# Patient Record
Sex: MALE | Race: WHITE
[De-identification: names, ages, dates, MRNs, and addresses within clinical notes are randomized per-mention and may not be internally consistent; named-entity substitution may affect disease eponyms.]

---

## 2017-06-15 ENCOUNTER — HOSPITAL ENCOUNTER (OUTPATIENT)
Dept: HOSPITAL 19 - COL.LAB | Age: 67
End: 2017-06-15
Payer: COMMERCIAL

## 2017-06-15 DIAGNOSIS — I10: Primary | ICD-10-CM

## 2017-06-15 DIAGNOSIS — D50.9: ICD-10-CM

## 2017-06-15 LAB
ADJUSTED CALCIUM: 9.5 MG/DL (ref 8.4–10.2)
ALBUMIN SERPL-MCNC: 4.3 GM/DL (ref 3.5–5)
ALP SERPL-CCNC: 66 U/L (ref 50–136)
ALT SERPL-CCNC: 39 U/L (ref 21–72)
ANION GAP SERPL CALC-SCNC: 14 MMOL/L (ref 7–16)
BILIRUB SERPL-MCNC: 0.7 MG/DL (ref 0–1)
BUN SERPL-MCNC: 18 MG/DL (ref 9–20)
CALCIUM SERPL-MCNC: 9.7 MG/DL (ref 8.4–10.2)
CHLORIDE SERPL-SCNC: 105 MMOL/L (ref 98–107)
CO2 SERPL-SCNC: 25 MMOL/L (ref 22–30)
CREAT SERPL-SCNC: 1.14 MG/DL (ref 0.66–1.25)
ERYTHROCYTE [DISTWIDTH] IN BLOOD BY AUTOMATED COUNT: 21.3 % (ref 11.5–14.5)
GLUCOSE SERPL-MCNC: 110 MG/DL (ref 74–106)
HCT VFR BLD AUTO: 40.3 % (ref 42–52)
HGB BLD-MCNC: 11.4 G/DL (ref 13.5–18)
MCH RBC QN AUTO: 19 PG (ref 27–31)
MCHC RBC AUTO-ENTMCNC: 28 G/DL (ref 33–37)
MCV RBC AUTO: 67 FL (ref 80–100)
PLATELET # BLD AUTO: 295 K/MM3 (ref 130–400)
PMV BLD AUTO: 10.4 FL (ref 7.4–10.4)
POTASSIUM SERPL-SCNC: 3.7 MMOL/L (ref 3.4–5)
PROT SERPL-MCNC: 7.2 GM/DL (ref 6.4–8.2)
RBC # BLD AUTO: 6.05 M/MM3 (ref 4.2–5.6)
SODIUM SERPL-SCNC: 144 MMOL/L (ref 137–145)
WBC # BLD AUTO: 12.9 K/MM3 (ref 4.8–10.8)

## 2017-09-12 ENCOUNTER — HOSPITAL ENCOUNTER (OUTPATIENT)
Dept: HOSPITAL 19 - SDCO | Age: 67
Discharge: HOME | End: 2017-09-12
Attending: INTERNAL MEDICINE
Payer: COMMERCIAL

## 2017-09-12 VITALS — SYSTOLIC BLOOD PRESSURE: 126 MMHG | DIASTOLIC BLOOD PRESSURE: 81 MMHG | TEMPERATURE: 98.1 F | HEART RATE: 85 BPM

## 2017-09-12 VITALS — SYSTOLIC BLOOD PRESSURE: 112 MMHG | HEART RATE: 81 BPM | DIASTOLIC BLOOD PRESSURE: 80 MMHG

## 2017-09-12 VITALS — HEART RATE: 82 BPM | DIASTOLIC BLOOD PRESSURE: 94 MMHG | SYSTOLIC BLOOD PRESSURE: 108 MMHG

## 2017-09-12 VITALS — HEART RATE: 72 BPM | DIASTOLIC BLOOD PRESSURE: 81 MMHG | SYSTOLIC BLOOD PRESSURE: 132 MMHG

## 2017-09-12 VITALS — SYSTOLIC BLOOD PRESSURE: 157 MMHG | DIASTOLIC BLOOD PRESSURE: 97 MMHG | HEART RATE: 103 BPM | TEMPERATURE: 98.1 F

## 2017-09-12 VITALS — BODY MASS INDEX: 27.03 KG/M2 | WEIGHT: 162.26 LBS | HEIGHT: 65 IN

## 2017-09-12 DIAGNOSIS — K29.80: ICD-10-CM

## 2017-09-12 DIAGNOSIS — K92.1: ICD-10-CM

## 2017-09-12 DIAGNOSIS — K29.30: ICD-10-CM

## 2017-09-12 DIAGNOSIS — K25.9: ICD-10-CM

## 2017-09-12 DIAGNOSIS — K57.30: Primary | ICD-10-CM

## 2017-09-12 DIAGNOSIS — D50.9: ICD-10-CM

## 2017-09-12 DIAGNOSIS — K64.0: ICD-10-CM

## 2017-11-22 ENCOUNTER — HOSPITAL ENCOUNTER (OUTPATIENT)
Dept: HOSPITAL 19 - COL.LAB | Age: 67
End: 2017-11-22
Payer: SELF-PAY

## 2017-11-22 DIAGNOSIS — Z90.5: ICD-10-CM

## 2017-11-22 DIAGNOSIS — R10.9: Primary | ICD-10-CM

## 2017-11-22 LAB
ANION GAP SERPL CALC-SCNC: 11 MMOL/L (ref 7–16)
BUN SERPL-MCNC: 21 MG/DL (ref 9–20)
CALCIUM SERPL-MCNC: 9.5 MG/DL (ref 8.4–10.2)
CHLORIDE SERPL-SCNC: 102 MMOL/L (ref 98–107)
CO2 SERPL-SCNC: 26 MMOL/L (ref 22–30)
CREAT SERPL-SCNC: 1.02 MG/DL (ref 0.66–1.25)
GLUCOSE SERPL-MCNC: 116 MG/DL (ref 74–106)
PH UR STRIP.AUTO: 5 [PH] (ref 5–8)
POTASSIUM SERPL-SCNC: 3.6 MMOL/L (ref 3.4–5)
RBC # UR: (no result) /HPF
SODIUM SERPL-SCNC: 139 MMOL/L (ref 137–145)
SP GR UR STRIP.AUTO: 1.01 (ref 1–1.03)
URN COLLECT METHOD CLASS: (no result)
WBC # UR: (no result) /HPF

## 2018-07-25 ENCOUNTER — HOSPITAL ENCOUNTER (OUTPATIENT)
Dept: HOSPITAL 19 - COL.LAB | Age: 68
End: 2018-07-25
Attending: NURSE PRACTITIONER
Payer: SELF-PAY

## 2018-07-25 DIAGNOSIS — Z01.89: Primary | ICD-10-CM

## 2018-07-25 LAB
ALBUMIN SERPL-MCNC: 4 GM/DL (ref 3.5–5)
ALP SERPL-CCNC: 90 U/L (ref 50–136)
ALT SERPL-CCNC: 44 U/L (ref 21–72)
ANION GAP SERPL CALC-SCNC: 9 MMOL/L (ref 7–16)
AST SERPL-CCNC: 33 U/L (ref 15–37)
BASOPHILS # BLD: 0.1 10*3/UL (ref 0–0.2)
BASOPHILS NFR BLD AUTO: 0.7 % (ref 0–2)
BILIRUB SERPL-MCNC: 0.5 MG/DL (ref 0–1)
BUN SERPL-MCNC: 19 MG/DL (ref 9–20)
CALCIUM SERPL-MCNC: 9.3 MG/DL (ref 8.4–10.2)
CHLORIDE SERPL-SCNC: 103 MMOL/L (ref 98–107)
CHOLEST SPEC-SCNC: 164 MG/DL (ref 120–200)
CHOLEST/HDLC SERPL-SRTO: 3
CO2 SERPL-SCNC: 29 MMOL/L (ref 22–30)
CREAT SERPL-SCNC: 1.2 MG/DL (ref 0.66–1.25)
EOSINOPHIL # BLD: 0.3 10*3/UL (ref 0–0.7)
EOSINOPHIL NFR BLD: 3.5 % (ref 0–4)
ERYTHROCYTE [DISTWIDTH] IN BLOOD BY AUTOMATED COUNT: 18.9 % (ref 11.5–14.5)
GLUCOSE SERPL-MCNC: 111 MG/DL (ref 74–106)
GRANULOCYTES # BLD AUTO: 56.2 % (ref 42.2–75.2)
HCT VFR BLD AUTO: 34.7 % (ref 42–52)
HDLC SERPL-MCNC: 54 MG/DL
HGB BLD-MCNC: 9.8 G/DL (ref 13.5–18)
LDLC SERPL-MCNC: 75 MG/DL
LYMPHOCYTES # BLD: 2.2 10*3/UL (ref 1.2–3.4)
LYMPHOCYTES NFR BLD: 28.6 % (ref 20–51)
MCH RBC QN AUTO: 19 PG (ref 27–31)
MCHC RBC AUTO-ENTMCNC: 28 G/DL (ref 33–37)
MCV RBC AUTO: 66 FL (ref 80–100)
MONOCYTES # BLD: 0.8 10*3/UL (ref 0.1–0.6)
MONOCYTES NFR BLD AUTO: 10.7 % (ref 1.7–9.3)
NEUTROPHILS # BLD: 4.3 10*3/UL (ref 1.4–6.5)
PLATELET # BLD AUTO: 345 K/MM3 (ref 130–400)
PMV BLD AUTO: 10 FL (ref 7.4–10.4)
POTASSIUM SERPL-SCNC: 3.5 MMOL/L (ref 3.4–5)
PROT SERPL-MCNC: 7.6 GM/DL (ref 6.4–8.2)
RBC # BLD AUTO: 5.29 M/MM3 (ref 4.2–5.6)
SODIUM SERPL-SCNC: 141 MMOL/L (ref 137–145)
TRIGL SERPL-MCNC: 175 MG/DL

## 2019-07-03 ENCOUNTER — HOSPITAL ENCOUNTER (EMERGENCY)
Dept: HOSPITAL 19 - COL.ER | Age: 69
Discharge: HOME | End: 2019-07-03
Payer: SELF-PAY

## 2019-07-03 VITALS — DIASTOLIC BLOOD PRESSURE: 96 MMHG | HEART RATE: 85 BPM | SYSTOLIC BLOOD PRESSURE: 163 MMHG

## 2019-07-03 VITALS — BODY MASS INDEX: 28.39 KG/M2 | HEIGHT: 65 IN | WEIGHT: 170.42 LBS

## 2019-07-03 VITALS — TEMPERATURE: 98.1 F

## 2019-07-03 DIAGNOSIS — M10.9: Primary | ICD-10-CM

## 2019-07-03 DIAGNOSIS — I10: ICD-10-CM

## 2019-07-03 LAB
ALBUMIN SERPL-MCNC: 4.2 GM/DL (ref 3.5–5)
ALP SERPL-CCNC: 113 U/L (ref 50–136)
ALT SERPL-CCNC: < 6 U/L (ref 21–72)
ANION GAP SERPL CALC-SCNC: 12 MMOL/L (ref 7–16)
ANION GAP SERPL CALC-SCNC: 16 MMOL/L (ref 7–16)
APPEARANCE SNV: (no result)
AST SERPL-CCNC: 27 U/L (ref 15–37)
BASOPHILS # BLD: 0 10*3/UL (ref 0–0.2)
BASOPHILS NFR BLD AUTO: 0.4 % (ref 0–2)
BILIRUB SERPL-MCNC: 0.8 MG/DL (ref 0–1)
BUN SERPL-MCNC: 13 MG/DL (ref 9–20)
BUN SERPL-MCNC: 13 MG/DL (ref 9–20)
CALCIUM SERPL-MCNC: 8.4 MG/DL (ref 8.4–10.2)
CALCIUM SERPL-MCNC: 9.3 MG/DL (ref 8.4–10.2)
CHLORIDE SERPL-SCNC: 106 MMOL/L (ref 98–107)
CHLORIDE SERPL-SCNC: 108 MMOL/L (ref 98–107)
CO2 SERPL-SCNC: 16 MMOL/L (ref 22–30)
CO2 SERPL-SCNC: 18 MMOL/L (ref 22–30)
COLOR SNV: (no result)
CREAT SERPL-SCNC: 0.81 UMOL/L (ref 0.66–1.25)
CREAT SERPL-SCNC: 0.83 UMOL/L (ref 0.66–1.25)
CRP SERPL-MCNC: 6.3 MG/DL (ref 0–0.9)
EOSINOPHIL # BLD: 0.2 10*3/UL (ref 0–0.7)
EOSINOPHIL NFR BLD: 1.4 % (ref 0–4)
ERYTHROCYTE [DISTWIDTH] IN BLOOD BY AUTOMATED COUNT: 17.3 % (ref 11.5–14.5)
ERYTHROCYTE [SEDIMENTATION RATE] IN BLOOD: 44 MM/HR (ref 0–30)
GLUCOSE SERPL-MCNC: 115 MG/DL (ref 74–106)
GLUCOSE SERPL-MCNC: 99 MG/DL (ref 74–106)
GRANULOCYTES # BLD AUTO: 70.2 % (ref 42.2–75.2)
HCT VFR BLD AUTO: 33 % (ref 42–52)
HGB BLD-MCNC: 9.7 G/DL (ref 13.5–18)
LYMPHOCYTES # BLD: 2 10*3/UL (ref 1.2–3.4)
LYMPHOCYTES NFR BLD: 18.2 % (ref 20–51)
MCH RBC QN AUTO: 20 PG (ref 27–31)
MCHC RBC AUTO-ENTMCNC: 29 G/DL (ref 33–37)
MCV RBC AUTO: 67 FL (ref 80–100)
MONOCYTES # BLD: 1 10*3/UL (ref 0.1–0.6)
MONOCYTES NFR BLD AUTO: 9.3 % (ref 1.7–9.3)
MONONUC CELLS # SNV: 7.2 % (ref 0–75)
NEUTROPHILS # BLD: 7.8 10*3/UL (ref 1.4–6.5)
PLATELET # BLD AUTO: 428 K/MM3 (ref 130–400)
PMV BLD AUTO: 9.5 FL (ref 7.4–10.4)
POTASSIUM SERPL-SCNC: 3.5 MMOL/L (ref 3.4–5)
POTASSIUM SERPL-SCNC: 3.7 MMOL/L (ref 3.4–5)
PROT SERPL-MCNC: 8.8 GM/DL (ref 6.4–8.2)
RBC # BLD AUTO: 4.94 M/MM3 (ref 4.2–5.6)
RBC # SNV AUTO: (no result) /MM3 (ref 0–0)
SODIUM SERPL-SCNC: 137 MMOL/L (ref 137–145)
SODIUM SERPL-SCNC: 138 MMOL/L (ref 137–145)
URATE SERPL-MCNC: 10.4 MG/DL (ref 3.5–8.5)
WBC # SNV AUTO: (no result) /MM3 (ref 200–600)

## 2023-04-20 ENCOUNTER — HOSPITAL ENCOUNTER (EMERGENCY)
Dept: HOSPITAL 19 - COL.ER | Age: 73
Discharge: HOME | End: 2023-04-20
Attending: EMERGENCY MEDICINE
Payer: SELF-PAY

## 2023-04-20 VITALS — HEIGHT: 60 IN | WEIGHT: 170.42 LBS

## 2023-04-20 VITALS — SYSTOLIC BLOOD PRESSURE: 145 MMHG | HEART RATE: 92 BPM | DIASTOLIC BLOOD PRESSURE: 83 MMHG

## 2023-04-20 VITALS — TEMPERATURE: 98.5 F

## 2023-04-20 DIAGNOSIS — K64.9: Primary | ICD-10-CM

## 2023-04-20 DIAGNOSIS — D72.829: ICD-10-CM

## 2023-04-20 LAB
ALBUMIN SERPL-MCNC: 3.9 GM/DL (ref 3.4–4.8)
ALP SERPL-CCNC: 94 U/L (ref 40–150)
ALT SERPL-CCNC: 26 U/L (ref 0–55)
ANION GAP SERPL CALC-SCNC: 12 MMOL/L (ref 7–16)
AST SERPL-CCNC: 26 U/L (ref 5–34)
BASOPHILS # BLD: 0 K/MM3 (ref 0–0.2)
BASOPHILS NFR BLD AUTO: 0.3 % (ref 0–2)
BILIRUB SERPL-MCNC: 1.2 MG/DL (ref 0.2–1.2)
BUN SERPL-MCNC: 15 MG/DL (ref 8–26)
CALCIUM SERPL-MCNC: 9.7 MG/DL (ref 8.4–10.2)
CHLORIDE SERPL-SCNC: 105 MMOL/L (ref 98–107)
CO2 SERPL-SCNC: 18 MMOL/L (ref 23–31)
CREAT SERPL-SCNC: 1.24 MG/DL (ref 0.72–1.25)
EOSINOPHIL # BLD: 0.1 K/MM3 (ref 0–0.7)
EOSINOPHIL NFR BLD: 0.5 % (ref 0–4)
ERYTHROCYTE [DISTWIDTH] IN BLOOD BY AUTOMATED COUNT: 13.3 % (ref 11.5–14.5)
GLUCOSE SERPL-MCNC: 110 MG/DL (ref 70–99)
GRANULOCYTES # BLD AUTO: 79.7 % (ref 42.2–75.2)
HCT VFR BLD AUTO: 46.8 % (ref 42–52)
HGB BLD-MCNC: 15.2 G/DL (ref 13.5–18)
INR BLD: 1.3 (ref 0.8–3)
LYMPHOCYTES # BLD: 1.4 K/MM3 (ref 1.2–3.4)
LYMPHOCYTES NFR BLD: 11.5 % (ref 20–51)
MCH RBC QN AUTO: 27 PG (ref 27–31)
MCHC RBC AUTO-ENTMCNC: 33 G/DL (ref 33–37)
MCV RBC AUTO: 83 FL (ref 80–100)
MONOCYTES # BLD: 0.9 K/MM3 (ref 0.1–0.6)
MONOCYTES NFR BLD AUTO: 7.6 % (ref 1.7–9.3)
NEUTROPHILS # BLD: 9.9 K/MM3 (ref 1.4–6.5)
PH UR STRIP.AUTO: 5.5 [PH] (ref 5–8.5)
PLATELET # BLD AUTO: 250 K/MM3 (ref 130–400)
PMV BLD AUTO: 10.6 FL (ref 7.4–10.4)
POTASSIUM SERPL-SCNC: 3.7 MMOL/L (ref 3.5–4.5)
PROT SERPL-MCNC: 8.4 GM/DL (ref 6.2–8.1)
PROTHROMBIN TIME: 14.4 SECONDS (ref 9.7–12.8)
RBC # BLD AUTO: 5.65 M/MM3 (ref 4.2–5.6)
RBC # UR STRIP.AUTO: (no result) /UL
RBC # UR: (no result) /HPF (ref 0–2)
SODIUM SERPL-SCNC: 135 MMOL/L (ref 136–145)
SP GR UR STRIP.AUTO: 1.01 (ref 1–1.03)
URN COLLECT METHOD CLASS: (no result)
UROBILINOGEN UR STRIP.AUTO-MCNC: 0.2 E.U/DL (ref 0.2–1)

## 2023-04-20 NOTE — NUR
met with patient and grandson, Jennifer Canas #[phone].
Patient's number is [phone].  Worker provided written information that
patient has Medicare part A.  Worker provided information on Medicare part
B/D and provided drug assistance cards.  Jennifer stated that patient's wife has
primary care at the [facility] and he will arrange patient's appointment.
Worker arranged for Financial counselor to contact patient/grandson and
complete financial assistance application and assess for medicaid eligibility.
 Worker collaborated with ED provider of the above information.